# Patient Record
Sex: FEMALE | Race: WHITE | NOT HISPANIC OR LATINO | Employment: OTHER | URBAN - METROPOLITAN AREA
[De-identification: names, ages, dates, MRNs, and addresses within clinical notes are randomized per-mention and may not be internally consistent; named-entity substitution may affect disease eponyms.]

---

## 2018-06-08 ENCOUNTER — OFFICE VISIT (OUTPATIENT)
Dept: UROLOGY | Facility: MEDICAL CENTER | Age: 68
End: 2018-06-08
Payer: COMMERCIAL

## 2018-06-08 VITALS
HEIGHT: 66 IN | SYSTOLIC BLOOD PRESSURE: 130 MMHG | DIASTOLIC BLOOD PRESSURE: 80 MMHG | WEIGHT: 190 LBS | BODY MASS INDEX: 30.53 KG/M2

## 2018-06-08 DIAGNOSIS — N20.0 CALCULUS OF KIDNEY: Primary | ICD-10-CM

## 2018-06-08 LAB
SL AMB  POCT GLUCOSE, UA: ABNORMAL
SL AMB LEUKOCYTE ESTERASE,UA: ABNORMAL
SL AMB POCT BILIRUBIN,UA: ABNORMAL
SL AMB POCT BLOOD,UA: ABNORMAL
SL AMB POCT CLARITY,UA: CLEAR
SL AMB POCT COLOR,UA: YELLOW
SL AMB POCT KETONES,UA: ABNORMAL
SL AMB POCT NITRITE,UA: ABNORMAL
SL AMB POCT PH,UA: 5.5
SL AMB POCT SPECIFIC GRAVITY,UA: 1.02
SL AMB POCT URINE PROTEIN: ABNORMAL
SL AMB POCT UROBILINOGEN: 0.2

## 2018-06-08 PROCEDURE — 81003 URINALYSIS AUTO W/O SCOPE: CPT | Performed by: UROLOGY

## 2018-06-08 PROCEDURE — 4040F PNEUMOC VAC/ADMIN/RCVD: CPT | Performed by: UROLOGY

## 2018-06-08 PROCEDURE — 99213 OFFICE O/P EST LOW 20 MIN: CPT | Performed by: UROLOGY

## 2018-06-08 RX ORDER — LEVOTHYROXINE SODIUM 88 UG/1
TABLET ORAL
COMMUNITY
Start: 2018-05-22 | End: 2019-06-14 | Stop reason: SDUPTHER

## 2018-06-08 RX ORDER — FLUTICASONE PROPIONATE 50 MCG
2 SPRAY, SUSPENSION (ML) NASAL
COMMUNITY
Start: 2018-05-18 | End: 2021-07-30

## 2018-06-08 NOTE — PROGRESS NOTES
Assessment/Plan:    Calculus of kidney  KUB last year documented a small right renal stone  This is not evident on KUB this year  We will continue to follow  Dietary counseling provided  She will return in 1 year  We will consider the need for further radiologic evaluation at that time  Diagnoses and all orders for this visit:    Calculus of kidney  -     POCT urine dip auto non-scope    Other orders  -     Cholecalciferol 2000 units CAPS; Take 1 capsule by mouth  -     fluticasone (FLONASE) 50 mcg/act nasal spray; 2 sprays into each nostril  -     levothyroxine 88 mcg tablet;           Subjective:      Patient ID: Neto Brown is a 79 y o  female  60-year-old female followed for kidney stones  She underwent ureteroscopic stone extraction 2011  She reports she has been voiding well  There is no gross hematuria, dysuria or symptoms of infection  She has not had any symptoms of kidney stones  She has not passed any stones recently  She has no flank pain  She is pleased with her voiding pattern  She continues to try to increase her fluids  The following portions of the patient's history were reviewed and updated as appropriate: allergies, current medications, past family history, past medical history, past social history, past surgical history and problem list     Review of Systems   Constitutional: Positive for unexpected weight change  Negative for activity change, appetite change, fatigue and fever  Wt gain   HENT: Negative  Eyes: Negative  Respiratory: Negative  Cardiovascular: Negative  Gastrointestinal: Negative for blood in stool, constipation, diarrhea and vomiting  Endocrine: Negative  Musculoskeletal: Negative  Skin: Negative  Allergic/Immunologic: Negative  Neurological: Negative  Hematological: Negative  Psychiatric/Behavioral: Negative            Objective:      /80 (BP Location: Left arm, Patient Position: Sitting)   Ht 5' 5 5" (1 664 m)   Wt 86 2 kg (190 lb)   BMI 31 14 kg/m²          Physical Exam   Constitutional: She is oriented to person, place, and time  She appears well-developed and well-nourished  HENT:   Head: Normocephalic and atraumatic  Eyes: Conjunctivae are normal    Neck: Neck supple  Cardiovascular: Normal rate  Pulmonary/Chest: Effort normal    Abdominal: Soft  She exhibits no distension and no mass  There is no tenderness  There is no rebound, no guarding and no CVA tenderness  Musculoskeletal: She exhibits no edema  Neurological: She is alert and oriented to person, place, and time  Skin: Skin is warm and dry  Psychiatric: She has a normal mood and affect   Her behavior is normal  Judgment and thought content normal

## 2018-06-08 NOTE — PATIENT INSTRUCTIONS
Kidney Stones   WHAT YOU NEED TO KNOW:   What is a kidney stone? Kidney stones form in the urinary system when the water and waste in your urine are out of balance  When this happens, certain types of waste crystals separate from the urine  The crystals build up and form kidney stones  Kidney stones can be made of uric acid, calcium, phosphate, or oxalate crystals  You may have 1 or more kidney stones  What increases my risk for kidney stones? · You do not drink enough liquids (especially water) each day  · You have urinary tract infections often  · You follow a certain type of diet  For example, people who eat a diet high in meat or salt may be at higher risk for kidney stones  People who eat foods high in oxalate may also be at higher risk  Foods that are high in oxalate include nuts, chocolate, coffee, and green leafy vegetables  · You take certain medicines such as diuretics, steroids, and antacids  · A family member has had kidney stones  · You were born with a kidney or bowel disorder, or you have other medical problems such as gout  What are the signs and symptoms of kidney stones? · Pain in the middle of your back that moves across to your side or that may spread to your groin    · Nausea and vomiting    · Urge to urinate often, burning feeling when you urinate, or pink or red urine    · Tenderness in your lower back, side, or stomach  How are kidney stones diagnosed? Your healthcare provider will ask about your health, diet, and lifestyle  He may refer you to a urologist  Rosanna Roth may need tests to find out what type of kidney stones you have  Tests can show the size of your kidney stones and where they are in your urinary system  You may have one or more of the following:  · Urine tests  may show if you have blood in your urine  They may also show high amounts of the substances that form kidney stones, such as uric acid  · Blood tests  show how well your kidneys are working   They may also be used to check the levels of calcium or uric acid in your blood  · A noncontrast helical CT scan  is a type of x-ray that uses a computer to take pictures of your kidneys  Healthcare providers use the pictures to check for kidney stones or other problems  · X-rays  of your kidneys, bladder, and ureters may be done  You may be given a dye before the pictures are taken to help healthcare providers see the pictures better  You may need to have more than one x-ray  Tell the healthcare provider if you have ever had an allergic reaction to contrast dye  · An abdominal ultrasound  uses sound waves to show pictures of your abdomen on a monitor  How are kidney stones treated? · NSAIDs , such as ibuprofen, help decrease swelling, pain, and fever  This medicine is available with or without a doctor's order  NSAIDs can cause stomach bleeding or kidney problems in certain people  If you take blood thinner medicine, always ask your healthcare provider if NSAIDs are safe for you  Always read the medicine label and follow directions  · Prescription medicine  may be given  Ask how to take this medicine safely  · Medicines  to balance your electrolytes may be needed  · A procedure or surgery  to remove the kidney stones may be needed if they do not pass on their own  Your treatment will depend on the size and location of your kidney stones  How can I manage my symptoms? · Drink plenty of liquids  Your healthcare provider may tell you to drink at least 8 to 12 (eight-ounce) cups of liquids each day  This helps flush out the kidney stones when you urinate  Water is the best liquid to drink  · Strain your urine every time you go to the bathroom  Urinate through a strainer or a piece of thin cloth to catch the stones  Take the stones to your healthcare provider so they can be sent to the lab for tests  This will help your healthcare providers plan the best treatment for you             · Eat a variety of healthy foods  Healthy foods include fruits, vegetables, whole-grain breads, low-fat dairy products, beans, and fish  You may need to limit how much sodium (salt) or protein you eat  Ask for information about the best foods for you  · Exercise regularly  Your stones may pass more easily if you stay active  Ask about the best activities for you  When should I seek immediate care? · You have vomiting that is not relieved by medicine  When should I contact my healthcare provider? · You have a fever  · You have trouble passing urine  · You see blood in your urine  · You have severe pain  · You have any questions or concerns about your condition or care  CARE AGREEMENT:   You have the right to help plan your care  Learn about your health condition and how it may be treated  Discuss treatment options with your caregivers to decide what care you want to receive  You always have the right to refuse treatment  The above information is an  only  It is not intended as medical advice for individual conditions or treatments  Talk to your doctor, nurse or pharmacist before following any medical regimen to see if it is safe and effective for you  © 2017 [employer] Information is for End User's use only and may not be sold, redistributed or otherwise used for commercial purposes  All illustrations and images included in CareNotes® are the copyrighted property of A [employer]  or Gagan Guillen

## 2018-06-08 NOTE — ASSESSMENT & PLAN NOTE
KUB last year documented a small right renal stone  This is not evident on KUB this year  We will continue to follow  Dietary counseling provided  She will return in 1 year  We will consider the need for further radiologic evaluation at that time

## 2018-06-08 NOTE — LETTER
June 8, 2018     Gabby Gauthier MD  [address]    Patient: James Woodson   YOB: 1950   Date of Visit: 6/8/2018       Dear Dr Lesley Davenport: Thank you for referring James Woodson to me for evaluation  Below are my notes for this consultation  If you have questions, please do not hesitate to call me  I look forward to following your patient along with you  Sincerely,        Poli Sampson MD        CC: No Recipients  Poli Sampson MD  6/8/2018  1:19 PM  Sign at close encounter  Assessment/Plan:    Calculus of kidney  KUB last year documented a small right renal stone  This is not evident on KUB this year  We will continue to follow  Dietary counseling provided  She will return in 1 year  We will consider the need for further radiologic evaluation at that time  Diagnoses and all orders for this visit:    Calculus of kidney  -     POCT urine dip auto non-scope    Other orders  -     Cholecalciferol 2000 units CAPS; Take 1 capsule by mouth  -     fluticasone (FLONASE) 50 mcg/act nasal spray; 2 sprays into each nostril  -     levothyroxine 88 mcg tablet;           Subjective:      Patient ID: James Woodson is a 79 y o  female  80-year-old female followed for kidney stones  She underwent ureteroscopic stone extraction 2011  She reports she has been voiding well  There is no gross hematuria, dysuria or symptoms of infection  She has not had any symptoms of kidney stones  She has not passed any stones recently  She has no flank pain  She is pleased with her voiding pattern  She continues to try to increase her fluids  The following portions of the patient's history were reviewed and updated as appropriate: allergies, current medications, past family history, past medical history, past social history, past surgical history and problem list     Review of Systems   Constitutional: Positive for unexpected weight change   Negative for activity change, appetite change, fatigue and fever  Wt gain   HENT: Negative  Eyes: Negative  Respiratory: Negative  Cardiovascular: Negative  Gastrointestinal: Negative for blood in stool, constipation, diarrhea and vomiting  Endocrine: Negative  Musculoskeletal: Negative  Skin: Negative  Allergic/Immunologic: Negative  Neurological: Negative  Hematological: Negative  Psychiatric/Behavioral: Negative  Objective:      /80 (BP Location: Left arm, Patient Position: Sitting)   Ht 5' 5 5" (1 664 m)   Wt 86 2 kg (190 lb)   BMI 31 14 kg/m²           Physical Exam   Constitutional: She is oriented to person, place, and time  She appears well-developed and well-nourished  HENT:   Head: Normocephalic and atraumatic  Eyes: Conjunctivae are normal    Neck: Neck supple  Cardiovascular: Normal rate  Pulmonary/Chest: Effort normal    Abdominal: Soft  She exhibits no distension and no mass  There is no tenderness  There is no rebound, no guarding and no CVA tenderness  Musculoskeletal: She exhibits no edema  Neurological: She is alert and oriented to person, place, and time  Skin: Skin is warm and dry  Psychiatric: She has a normal mood and affect   Her behavior is normal  Judgment and thought content normal

## 2019-06-14 ENCOUNTER — OFFICE VISIT (OUTPATIENT)
Dept: UROLOGY | Facility: MEDICAL CENTER | Age: 69
End: 2019-06-14
Payer: COMMERCIAL

## 2019-06-14 VITALS
BODY MASS INDEX: 29.89 KG/M2 | SYSTOLIC BLOOD PRESSURE: 140 MMHG | WEIGHT: 186 LBS | DIASTOLIC BLOOD PRESSURE: 86 MMHG | HEART RATE: 60 BPM | HEIGHT: 66 IN

## 2019-06-14 DIAGNOSIS — N20.0 CALCULUS OF KIDNEY: Primary | ICD-10-CM

## 2019-06-14 PROBLEM — A69.20 LYME DISEASE: Status: ACTIVE | Noted: 2019-06-14

## 2019-06-14 LAB
SL AMB  POCT GLUCOSE, UA: NORMAL
SL AMB LEUKOCYTE ESTERASE,UA: NORMAL
SL AMB POCT BILIRUBIN,UA: NORMAL
SL AMB POCT BLOOD,UA: NORMAL
SL AMB POCT CLARITY,UA: CLEAR
SL AMB POCT COLOR,UA: YELLOW
SL AMB POCT KETONES,UA: NORMAL
SL AMB POCT NITRITE,UA: NORMAL
SL AMB POCT PH,UA: 7
SL AMB POCT SPECIFIC GRAVITY,UA: 1.01
SL AMB POCT URINE PROTEIN: NORMAL
SL AMB POCT UROBILINOGEN: 0.2

## 2019-06-14 PROCEDURE — 99213 OFFICE O/P EST LOW 20 MIN: CPT | Performed by: UROLOGY

## 2019-06-14 PROCEDURE — 81003 URINALYSIS AUTO W/O SCOPE: CPT | Performed by: UROLOGY

## 2019-06-14 RX ORDER — LEVOTHYROXINE SODIUM 0.07 MG/1
75 TABLET ORAL DAILY
Refills: 5 | COMMUNITY
Start: 2019-06-02

## 2020-06-26 ENCOUNTER — OFFICE VISIT (OUTPATIENT)
Dept: UROLOGY | Facility: MEDICAL CENTER | Age: 70
End: 2020-06-26
Payer: COMMERCIAL

## 2020-06-26 VITALS
BODY MASS INDEX: 31.32 KG/M2 | TEMPERATURE: 97.8 F | SYSTOLIC BLOOD PRESSURE: 148 MMHG | WEIGHT: 188 LBS | HEIGHT: 65 IN | DIASTOLIC BLOOD PRESSURE: 88 MMHG

## 2020-06-26 DIAGNOSIS — N20.0 CALCULUS OF KIDNEY: Primary | ICD-10-CM

## 2020-06-26 LAB
SL AMB  POCT GLUCOSE, UA: ABNORMAL
SL AMB LEUKOCYTE ESTERASE,UA: ABNORMAL
SL AMB POCT BILIRUBIN,UA: ABNORMAL
SL AMB POCT BLOOD,UA: ABNORMAL
SL AMB POCT CLARITY,UA: CLEAR
SL AMB POCT COLOR,UA: YELLOW
SL AMB POCT KETONES,UA: ABNORMAL
SL AMB POCT NITRITE,UA: ABNORMAL
SL AMB POCT PH,UA: 5
SL AMB POCT SPECIFIC GRAVITY,UA: 1.02
SL AMB POCT URINE PROTEIN: ABNORMAL
SL AMB POCT UROBILINOGEN: 0.2

## 2020-06-26 PROCEDURE — 99213 OFFICE O/P EST LOW 20 MIN: CPT | Performed by: UROLOGY

## 2020-06-26 PROCEDURE — 81003 URINALYSIS AUTO W/O SCOPE: CPT | Performed by: UROLOGY

## 2020-06-26 RX ORDER — LEVOTHYROXINE SODIUM 88 UG/1
88 TABLET ORAL DAILY
COMMUNITY
Start: 2020-06-05

## 2020-07-15 ENCOUNTER — TELEPHONE (OUTPATIENT)
Dept: UROLOGY | Facility: MEDICAL CENTER | Age: 70
End: 2020-07-15

## 2020-07-15 NOTE — TELEPHONE ENCOUNTER
Saturnino Mcguire from [facility] coding department calling to inquire about XR on 6/14      She can be reached at [phone]

## 2021-07-30 ENCOUNTER — OFFICE VISIT (OUTPATIENT)
Dept: UROLOGY | Facility: MEDICAL CENTER | Age: 71
End: 2021-07-30
Payer: COMMERCIAL

## 2021-07-30 VITALS — HEIGHT: 66 IN | WEIGHT: 180 LBS | BODY MASS INDEX: 28.93 KG/M2

## 2021-07-30 DIAGNOSIS — N20.0 CALCULUS OF KIDNEY: Primary | ICD-10-CM

## 2021-07-30 LAB
SL AMB  POCT GLUCOSE, UA: NORMAL
SL AMB LEUKOCYTE ESTERASE,UA: NORMAL
SL AMB POCT BILIRUBIN,UA: NORMAL
SL AMB POCT BLOOD,UA: NORMAL
SL AMB POCT CLARITY,UA: CLEAR
SL AMB POCT COLOR,UA: YELLOW
SL AMB POCT KETONES,UA: NORMAL
SL AMB POCT NITRITE,UA: NORMAL
SL AMB POCT PH,UA: 5.5
SL AMB POCT SPECIFIC GRAVITY,UA: 1.01
SL AMB POCT URINE PROTEIN: NORMAL
SL AMB POCT UROBILINOGEN: 0.2

## 2021-07-30 PROCEDURE — 99214 OFFICE O/P EST MOD 30 MIN: CPT | Performed by: PHYSICIAN ASSISTANT

## 2021-07-30 PROCEDURE — 81003 URINALYSIS AUTO W/O SCOPE: CPT | Performed by: PHYSICIAN ASSISTANT

## 2021-07-30 RX ORDER — ROSUVASTATIN CALCIUM 10 MG/1
10 TABLET, COATED ORAL EVERY EVENING
COMMUNITY
Start: 2021-07-09

## 2021-07-30 NOTE — PROGRESS NOTES
7/30/2021      Chief Complaint   Patient presents with    Nephrolithiasis         Assessment and Plan    79 y o  female managed by Dr Carri March    1  Right nephrolithiasis  · Urine today:  Trace blood  · Renal ultrasound 06/25/2021 at [facility] revealed 2 small nonobstructing right renal calculi  · Patient currently asymptomatic  · Again reviewed dietary modifications  · Continue daily water intake of 40-60 oz  · ED parameters reviewed  · Follow-up in 1 year with ultrasound of kidney and bladder  History of Present Illness  Sandra Gilmore is a 79 y o  female here for evaluation nephrolithiasis  She had surgical intervention for nephrolithiasis 10 years ago and has not had any subsequent issues  At her annual visit with Dr Carri March  last year, her KUB did not identify any urinary tract calculi  She followed up this year with renal ultrasound which did reveal 2 small nonobstructing right renal calculi  She denies any flank or abdominal pain  She denies any dysuria, frequency, urgency  She denies any gross hematuria  She denies any nausea or vomiting  She denies any fevers or chills  She does try to remain well hydrated with 40-60 oz of water a day  She does drink a cup of tea every morning  Review of Systems   Constitutional: Negative for chills and fever  HENT: Negative  Respiratory: Negative  Cardiovascular: Negative  Gastrointestinal: Negative for abdominal pain, nausea and vomiting  Recently diagnosed with umbilical hernia  No surgical intervention planned at this time  Genitourinary: Negative for difficulty urinating, dysuria, flank pain, frequency, hematuria and urgency  Denies issues with incontinence  Occasional nocturia 1x per night  Musculoskeletal: Negative  Skin: Negative  Neurological: Negative        Vitals  Vitals:    07/30/21 1055   Weight: 81 6 kg (180 lb)   Height: 5' 5 5" (1 664 m)       Physical Exam  Constitutional:       General: She is not in acute distress  Appearance: Normal appearance  She is not ill-appearing, toxic-appearing or diaphoretic  HENT:      Head: Normocephalic and atraumatic  Eyes:      Conjunctiva/sclera: Conjunctivae normal    Pulmonary:      Effort: Pulmonary effort is normal  No respiratory distress  Abdominal:      General: There is no distension  Palpations: Abdomen is soft  Tenderness: There is no abdominal tenderness  There is no right CVA tenderness, left CVA tenderness, guarding or rebound  Musculoskeletal:         General: Normal range of motion  Cervical back: Normal range of motion  Skin:     General: Skin is warm and dry  Neurological:      General: No focal deficit present  Mental Status: She is alert and oriented to person, place, and time  Psychiatric:         Mood and Affect: Mood normal          Behavior: Behavior normal          Thought Content: Thought content normal          Judgment: Judgment normal        Past History  Past Medical History:   Diagnosis Date    Hypothyroidism     Kidney stone     Pancreas anomaly, congenital      Social History     Socioeconomic History    Marital status:      Spouse name: None    Number of children: None    Years of education: None    Highest education level: None   Occupational History    None   Tobacco Use    Smoking status: Never Smoker    Smokeless tobacco: Never Used   Substance and Sexual Activity    Alcohol use: No    Drug use: No    Sexual activity: None   Other Topics Concern    None   Social History Narrative    None     Social Determinants of Health     Financial Resource Strain:     Difficulty of Paying Living Expenses:    Food Insecurity:     Worried About Running Out of Food in the Last Year:     Ran Out of Food in the Last Year:    Transportation Needs:     Lack of Transportation (Medical):      Lack of Transportation (Non-Medical):    Physical Activity:     Days of Exercise per Week:     Minutes of Exercise per Session:    Stress:     Feeling of Stress :    Social Connections:     Frequency of Communication with Friends and Family:     Frequency of Social Gatherings with Friends and Family:     Attends [employer] Services:     Active Member of Clubs or Organizations:     Attends Club or Organization Meetings:     Marital Status:    Intimate Partner Violence:     Fear of Current or Ex-Partner:     Emotionally Abused:     Physically Abused:     Sexually Abused:      Social History     Tobacco Use   Smoking Status Never Smoker   Smokeless Tobacco Never Used     Family History   Problem Relation Age of Onset    GI problems Mother     Lung cancer Father        The following portions of the patient's history were reviewed and updated as appropriate: allergies, current medications, past medical history, past social history, past surgical history and problem list     Results  No results found for this or any previous visit (from the past 1 hour(s))  ]  No results found for: PSA  No results found for: GLUCOSE, CALCIUM, NA, K, CO2, CL, BUN, CREATININE  No results found for: WBC, HGB, HCT, MCV, PLT    Mari Wu PA-C

## 2022-06-27 ENCOUNTER — HOSPITAL ENCOUNTER (OUTPATIENT)
Dept: ULTRASOUND IMAGING | Facility: HOSPITAL | Age: 72
Discharge: HOME | End: 2022-06-27
Payer: COMMERCIAL

## 2022-06-27 DIAGNOSIS — N20.0 CALCULUS OF KIDNEY: ICD-10-CM

## 2022-06-27 PROCEDURE — 76770 US EXAM ABDO BACK WALL COMP: CPT

## 2022-06-30 ENCOUNTER — TELEPHONE (OUTPATIENT)
Dept: UROLOGY | Facility: AMBULATORY SURGERY CENTER | Age: 72
End: 2022-06-30

## 2022-06-30 NOTE — TELEPHONE ENCOUNTER
Pls call patient and ask her if she is currently experiencing any UTI symptoms  (fevers, chills, dysuria, or hematuria)  Findings on US may indicate cystitis  If not, will review at her ofice visit on 7/11       Thanks

## 2022-06-30 NOTE — TELEPHONE ENCOUNTER
Radiology Test Results - Radiology Calling with report update    Pt under care of: Alicia Lezama    Significant Findings - Please review

## 2022-06-30 NOTE — TELEPHONE ENCOUNTER
Call placed to patient and spoke with her  Reviewed the recommendations of the AP with patient  She stated that she is feeling great and is not having any symptoms at this time  She will discuss this diagnosis further at her July appointment

## 2022-07-11 ENCOUNTER — OFFICE VISIT (OUTPATIENT)
Dept: UROLOGY | Facility: MEDICAL CENTER | Age: 72
End: 2022-07-11
Payer: COMMERCIAL

## 2022-07-11 VITALS
BODY MASS INDEX: 29.83 KG/M2 | WEIGHT: 182 LBS | SYSTOLIC BLOOD PRESSURE: 160 MMHG | HEART RATE: 76 BPM | DIASTOLIC BLOOD PRESSURE: 78 MMHG

## 2022-07-11 DIAGNOSIS — N20.0 NEPHROLITHIASIS: Primary | ICD-10-CM

## 2022-07-11 PROCEDURE — 99214 OFFICE O/P EST MOD 30 MIN: CPT | Performed by: PHYSICIAN ASSISTANT

## 2022-07-11 NOTE — PROGRESS NOTES
7/11/2022      Chief Complaint   Patient presents with    Nephrolithiasis     Fallow  up         Assessment and Plan    70 y o  female managed by Dr Cindy Kendall     1  Nephrolithiasis  · Stable right sided nephrolithiasis  Simple renal cysts  · Urine today: negative  · Incidental finding of debris in bladder on US likely over read  · Remains asymptomatic  · Reviewed dietary modifications and encouraged daily water intake of 40-60 oz with addition of lemon  · Offered patient to continue follow up annually with her PCP and refer to us if there are any issues  She wishes to continue annual follow up with our service  · Follow up in 1 year  No need for renal US prior  · ED parameters reviewed  History of Present Illness  Hilary Hong is a 70 y o  female here for evaluation of nephrolithiasis  Patient has right-sided nephrolithiasis remained stable this urine imaging  She also has simple benign renal cysts  There was an incidental finding of debris in her bladder on ultrasound but urine today is negative  She denies any urinary symptoms  She denies any flank or abdominal pain  She denies any fevers or chills  She does report not drinking enough water daily but she is trying  Review of Systems   Constitutional: Negative for chills and fever  HENT: Negative  Respiratory: Negative  Cardiovascular: Negative  Gastrointestinal: Negative for abdominal pain, nausea and vomiting  Genitourinary: Negative for difficulty urinating, dysuria, flank pain, frequency, hematuria and urgency  Musculoskeletal: Negative  Skin: Negative  Neurological: Negative  Vitals  Vitals:    07/11/22 1016   Weight: 82 6 kg (182 lb)       Physical Exam  Vitals reviewed  Constitutional:       General: She is not in acute distress  Appearance: Normal appearance  She is not ill-appearing, toxic-appearing or diaphoretic  HENT:      Head: Normocephalic and atraumatic     Eyes:      Conjunctiva/sclera: Conjunctivae normal    Pulmonary:      Effort: Pulmonary effort is normal  No respiratory distress  Abdominal:      General: There is no distension  Palpations: Abdomen is soft  Tenderness: There is no abdominal tenderness  There is no right CVA tenderness, left CVA tenderness, guarding or rebound  Musculoskeletal:         General: Normal range of motion  Cervical back: Normal range of motion  Skin:     General: Skin is warm and dry  Neurological:      General: No focal deficit present  Mental Status: She is alert and oriented to person, place, and time  Psychiatric:         Mood and Affect: Mood normal          Behavior: Behavior normal          Thought Content: Thought content normal          Judgment: Judgment normal        Past History  Past Medical History:   Diagnosis Date    Hypothyroidism     Kidney stone     Pancreas anomaly, congenital      Social History     Socioeconomic History    Marital status:       Spouse name: None    Number of children: None    Years of education: None    Highest education level: None   Occupational History    None   Tobacco Use    Smoking status: Never Smoker    Smokeless tobacco: Never Used   Substance and Sexual Activity    Alcohol use: No    Drug use: No    Sexual activity: None   Other Topics Concern    None   Social History Narrative    None     Social Determinants of Health     Financial Resource Strain: Not on file   Food Insecurity: Not on file   Transportation Needs: Not on file   Physical Activity: Not on file   Stress: Not on file   Social Connections: Not on file   Intimate Partner Violence: Not on file   Housing Stability: Not on file     Social History     Tobacco Use   Smoking Status Never Smoker   Smokeless Tobacco Never Used     Family History   Problem Relation Age of Onset    GI problems Mother     Lung cancer Father        The following portions of the patient's history were reviewed and updated as appropriate: allergies, current medications, past medical history, past social history, past surgical history and problem list     Results  No results found for this or any previous visit (from the past 1 hour(s))  ]  No results found for: PSA  No results found for: GLUCOSE, CALCIUM, NA, K, CO2, CL, BUN, CREATININE  No results found for: WBC, HGB, HCT, MCV, PLT    Tammy Crum PA-C